# Patient Record
Sex: MALE | Race: BLACK OR AFRICAN AMERICAN | NOT HISPANIC OR LATINO | URBAN - METROPOLITAN AREA
[De-identification: names, ages, dates, MRNs, and addresses within clinical notes are randomized per-mention and may not be internally consistent; named-entity substitution may affect disease eponyms.]

---

## 2022-02-08 ENCOUNTER — EMERGENCY (EMERGENCY)
Facility: HOSPITAL | Age: 47
LOS: 1 days | Discharge: HOME | End: 2022-02-08
Attending: EMERGENCY MEDICINE
Payer: SELF-PAY

## 2022-02-08 VITALS
DIASTOLIC BLOOD PRESSURE: 101 MMHG | TEMPERATURE: 99 F | HEART RATE: 93 BPM | WEIGHT: 270.07 LBS | OXYGEN SATURATION: 96 % | SYSTOLIC BLOOD PRESSURE: 183 MMHG | RESPIRATION RATE: 18 BRPM

## 2022-02-08 VITALS
DIASTOLIC BLOOD PRESSURE: 78 MMHG | SYSTOLIC BLOOD PRESSURE: 138 MMHG | OXYGEN SATURATION: 96 % | RESPIRATION RATE: 18 BRPM | TEMPERATURE: 98 F | HEART RATE: 80 BPM

## 2022-02-08 PROCEDURE — 99053 MED SERV 10PM-8AM 24 HR FAC: CPT

## 2022-02-08 PROCEDURE — 99284 EMERGENCY DEPT VISIT MOD MDM: CPT

## 2022-02-08 RX ORDER — AMLODIPINE BESYLATE 2.5 MG/1
1 TABLET ORAL
Qty: 14 | Refills: 0
Start: 2022-02-08

## 2022-02-08 RX ORDER — AMLODIPINE BESYLATE 2.5 MG/1
5 TABLET ORAL ONCE
Refills: 0 | Status: COMPLETED | OUTPATIENT
Start: 2022-02-08 | End: 2022-02-08

## 2022-02-08 RX ADMIN — AMLODIPINE BESYLATE 5 MILLIGRAM(S): 2.5 TABLET ORAL at 23:35

## 2022-02-08 NOTE — ED PROVIDER NOTE - NSFOLLOWUPCLINICS_GEN_ALL_ED_FT
[facility]  Internal Medicine  [address] [place] [address]  Phone: [phone]  Fax: [phone]

## 2022-02-08 NOTE — ED PROVIDER NOTE - CLINICAL SUMMARY MEDICAL DECISION MAKING FREE TEXT BOX
Pt under [facility] custody (shield # [id number]), referred to ED by prearraignment RN for /104.  Pt is asymptomatic, no chest pain, no shortness of breath, no HA or vision changes.  pt admits to noncompliance with HTN meds x 1 year, does not recall name of medication.  PE wnl    1130p 166/106 Norvasc 5mg ordered. 1130p 166/106 Norvasc 5mg ordered.  Pt is well appearing, has no new complaints and able to walk with normal gait. Pt is stable for discharge and follow up with medical doctor. Pt educated on care and need for follow up. Discussed anticipatory guidance and return precautions. Questions answered. I had a detailed discussion with the patient and/or guardian regarding the historical points, exam findings, and any diagnostic results supporting the discharge diagnosis.   Pt has no suicidal ideation  and was discharged in custody of [facility].

## 2022-02-08 NOTE — ED PROVIDER NOTE - PATIENT PORTAL LINK FT
You can access the FollowMyHealth Patient Portal offered by [facility] by registering at the following website: [email or web address]. By joining [facility]’s FollowMyHealth portal, you will also be able to view your health information using other applications (apps) compatible with our system.

## 2022-02-08 NOTE — ED PROVIDER NOTE - NSFOLLOWUPINSTRUCTIONS_ED_ALL_ED_FT
Hypertension is high blood pressure. Your blood pressure is the force of your blood moving against the walls of your arteries. Hypertension causes your blood pressure to get so high that your heart has to work much harder than normal. This can damage your heart. The cause of hypertension may not be known. This is called essential or primary hypertension. Hypertension caused by another medical condition, such as kidney disease, is called secondary hypertension.    DISCHARGE INSTRUCTIONS:    Call your local emergency number (911 in the [place]) or have someone call if:   •You have chest pain.      •You have any of the following signs of a heart attack: ?Squeezing, pressure, or pain in your chest      ?You may also have any of the following: ?Discomfort or pain in your back, neck, jaw, stomach, or arm      ?Shortness of breath      ?Nausea or vomiting      ?Lightheadedness or a sudden cold sweat        •You become confused or have trouble speaking.      •You suddenly feel lightheaded or have trouble breathing.      Return to the emergency department if:   •You have a severe headache or vision loss.      •You have weakness in an arm or leg.      Call your doctor or cardiologist if:   •You feel faint, dizzy, confused, or drowsy.      •You have been taking your blood pressure medicine but your pressure is higher than your provider says it should be.      •You have questions or concerns about your condition or care.      Medicines: You may need any of the following:  •Antihypertensives may be used to help lower your blood pressure. Several kinds of medicines are available. Your healthcare provider will choose medicines based on the kind of hypertension you have. You may need more than one type of medicine. Take the medicine exactly as directed.      •Diuretics help decrease extra fluid that collects in your body. This will help lower your blood pressure. You may urinate more often while you take this medicine.      •Cholesterol medicine helps lower your cholesterol level. A low cholesterol level helps prevent heart disease and makes it easier to control your blood pressure.      •Take your medicine as directed. Contact your healthcare provider if you think your medicine is not helping or if you have side effects. Tell him or her if you are allergic to any medicine. Keep a list of the medicines, vitamins, and herbs you take. Include the amounts, and when and why you take them. Bring the list or the pill bottles to follow-up visits. Carry your medicine list with you in case of an emergency.      Follow up with your doctor or cardiologist as directed: You will need to return to have your blood pressure checked and to have other lab tests done. Write down your questions so you remember to ask them during your visits.    Stages of hypertension:     Blood Pressure Readings     •Normal blood pressure is 119/79 or lower. Your healthcare provider may only check your blood pressure each year if it stays at a normal level.      •Elevated blood pressure is 120/79 to 129/79. This is sometimes called prehypertension. Your healthcare provider may suggest lifestyle changes to help lower your blood pressure to a normal level. He or she may then check it again in 3 to 6 months.      •Stage 1 hypertension is 130/80 to 139/89. Your provider may recommend lifestyle changes, medication, and checks every 3 to 6 months until your blood pressure is controlled.      •Stage 2 hypertension is 140/90 or higher. Your provider will recommend lifestyle changes and have you take 2 kinds of hypertension medicines. You will also need to have your blood pressure checked monthly until it is controlled.      Manage hypertension:   •Check your blood pressure at home. Avoid smoking, caffeine, and exercise at least 30 minutes before checking your blood pressure. Sit and rest for 5 minutes before you take your blood pressure. Extend your arm and support it on a flat surface. Your arm should be at the same level as your heart. Follow the directions that came with your blood pressure monitor. Check your blood pressure 2 times, 1 minute apart, before you take your medicine in the morning. Also check your blood pressure before your evening meal. Keep a record of your readings and bring it to your follow-up visits. Ask your healthcare provider what your blood pressure should be.  How to take a Blood Pressure           •Manage any other health conditions you have. Health conditions such as diabetes can increase your risk for hypertension. Follow your healthcare provider's instructions and take all your medicines as directed.      •Ask about all medicines. Certain medicines can increase your blood pressure. Examples include oral birth control pills, decongestants, herbal supplements, and NSAIDs, such as ibuprofen. Your healthcare provider can tell you which medicines are safe for you to take. This includes prescription and over-the-counter medicines.      Lifestyle changes you can make to manage hypertension: Your healthcare provider may recommend you work with a team to manage hypertension. The team may include medical experts such as a dietitian, an exercise or physical therapist, and a behavior therapist. Your family members may be included in helping you create lifestyle changes.    Ways to Lower Your Blood Pressure     •Limit sodium (salt) as directed. Too much sodium can affect your fluid balance. Check labels to find low-sodium or no-salt-added foods. Some low-sodium foods use potassium salts for flavor. Too much potassium can also cause health problems. Your healthcare provider will tell you how much sodium and potassium are safe for you to have in a day. He or she may recommend that you limit sodium to 2,300 mg a day.             •Follow the meal plan recommended by your healthcare provider. A dietitian or your provider can give you more information on low-sodium plans or the DASH (Dietary Approaches to Stop Hypertension) eating plan. The DASH plan is low in sodium, processed sugar, unhealthy fats, and total fat. It is high in potassium, calcium, and fiber. These can be found in vegetables, fruit, and whole-grain foods.             •Be physically active throughout the day. Physical activity, such as exercise, can help control your blood pressure and your weight. Be physically active for at least 30 minutes per day, on most days of the week. Include aerobic activity, such as walking or riding a bicycle. Also include strength training at least 2 times each week. Your healthcare providers can help you create a physical activity plan.  Ways to Be Physically Active       Strength Training for Adults           •Decrease stress. This may help lower your blood pressure. Learn ways to relax, such as deep breathing or listening to music.      •Limit alcohol as directed. Alcohol can increase your blood pressure. A drink of alcohol is 12 ounces of beer, 5 ounces of wine, or 1½ ounces of liquor.      •Do not smoke. Nicotine and other chemicals in cigarettes and cigars can increase your blood pressure and also cause lung damage. Ask your healthcare provider for information if you currently smoke and need help to quit. E-cigarettes or smokeless tobacco still contain nicotine. Talk to your healthcare provider before you use these products.  Prevent Heart Disease

## 2022-02-09 PROCEDURE — 99285 EMERGENCY DEPT VISIT HI MDM: CPT

## 2022-02-09 PROCEDURE — 93005 ELECTROCARDIOGRAM TRACING: CPT

## 2022-02-09 RX ADMIN — AMLODIPINE BESYLATE 5 MILLIGRAM(S): 2.5 TABLET ORAL at 00:01
